# Patient Record
Sex: MALE | Race: WHITE | NOT HISPANIC OR LATINO | Employment: FULL TIME | URBAN - METROPOLITAN AREA
[De-identification: names, ages, dates, MRNs, and addresses within clinical notes are randomized per-mention and may not be internally consistent; named-entity substitution may affect disease eponyms.]

---

## 2022-03-25 ENCOUNTER — TELEPHONE (OUTPATIENT)
Dept: SURGERY | Facility: CLINIC | Age: 42
End: 2022-03-25

## 2022-03-25 NOTE — TELEPHONE ENCOUNTER
"----- Message from Osvaldo Aguirre sent at 3/25/2022 12:22 PM CDT -----  Regarding: Speak with office  Contact: Noé  Consult/Advisory:           Name Of Caller: Noé    Contact Preference?: [phone]         What is the nature of the call?:    Pt is calling to check the status of a referral he said was sent over to the department about 2 weeks ago. Pt would like a call for further assistance.      Additional Notes:  "Thank you for all that you do for our patients'"     "

## 2022-03-28 ENCOUNTER — TELEPHONE (OUTPATIENT)
Dept: SURGERY | Facility: CLINIC | Age: 42
End: 2022-03-28

## 2022-03-28 ENCOUNTER — TELEPHONE (OUTPATIENT)
Dept: SURGERY | Facility: CLINIC | Age: 42
End: 2022-03-28
Payer: MEDICAID

## 2022-04-05 ENCOUNTER — OFFICE VISIT (OUTPATIENT)
Dept: SURGERY | Facility: CLINIC | Age: 42
End: 2022-04-05
Payer: COMMERCIAL

## 2022-04-05 VITALS
WEIGHT: 315 LBS | SYSTOLIC BLOOD PRESSURE: 185 MMHG | HEIGHT: 69 IN | BODY MASS INDEX: 46.65 KG/M2 | TEMPERATURE: 97 F | DIASTOLIC BLOOD PRESSURE: 93 MMHG | HEART RATE: 105 BPM

## 2022-04-05 DIAGNOSIS — D18.03 HEMANGIOMA OF LIVER: Primary | ICD-10-CM

## 2022-04-05 PROCEDURE — 99213 OFFICE O/P EST LOW 20 MIN: CPT | Mod: PBBFAC | Performed by: SURGERY

## 2022-04-05 PROCEDURE — 3077F PR MOST RECENT SYSTOLIC BLOOD PRESSURE >= 140 MM HG: ICD-10-PCS | Mod: CPTII,S$GLB,, | Performed by: SURGERY

## 2022-04-05 PROCEDURE — 3080F PR MOST RECENT DIASTOLIC BLOOD PRESSURE >= 90 MM HG: ICD-10-PCS | Mod: CPTII,S$GLB,, | Performed by: SURGERY

## 2022-04-05 PROCEDURE — 3080F DIAST BP >= 90 MM HG: CPT | Mod: CPTII,S$GLB,, | Performed by: SURGERY

## 2022-04-05 PROCEDURE — 4010F ACE/ARB THERAPY RXD/TAKEN: CPT | Mod: CPTII,S$GLB,, | Performed by: SURGERY

## 2022-04-05 PROCEDURE — 99999 PR PBB SHADOW E&M-EST. PATIENT-LVL III: ICD-10-PCS | Mod: PBBFAC,,, | Performed by: SURGERY

## 2022-04-05 PROCEDURE — 3077F SYST BP >= 140 MM HG: CPT | Mod: CPTII,S$GLB,, | Performed by: SURGERY

## 2022-04-05 PROCEDURE — 99203 PR OFFICE/OUTPT VISIT, NEW, LEVL III, 30-44 MIN: ICD-10-PCS | Mod: S$GLB,,, | Performed by: SURGERY

## 2022-04-05 PROCEDURE — 99999 PR PBB SHADOW E&M-EST. PATIENT-LVL III: CPT | Mod: PBBFAC,,, | Performed by: SURGERY

## 2022-04-05 PROCEDURE — 3008F BODY MASS INDEX DOCD: CPT | Mod: CPTII,S$GLB,, | Performed by: SURGERY

## 2022-04-05 PROCEDURE — 99203 OFFICE O/P NEW LOW 30 MIN: CPT | Mod: S$GLB,,, | Performed by: SURGERY

## 2022-04-05 PROCEDURE — 3008F PR BODY MASS INDEX (BMI) DOCUMENTED: ICD-10-PCS | Mod: CPTII,S$GLB,, | Performed by: SURGERY

## 2022-04-05 PROCEDURE — 4010F PR ACE/ARB THEARPY RXD/TAKEN: ICD-10-PCS | Mod: CPTII,S$GLB,, | Performed by: SURGERY

## 2022-04-05 RX ORDER — CHOLECALCIFEROL (VITAMIN D3) 50 MCG
1 CAPSULE ORAL
COMMUNITY
Start: 2022-03-02

## 2022-04-05 RX ORDER — DULAGLUTIDE 0.75 MG/.5ML
INJECTION, SOLUTION SUBCUTANEOUS
COMMUNITY
End: 2022-04-18

## 2022-04-05 RX ORDER — ATORVASTATIN CALCIUM 40 MG/1
40 TABLET, FILM COATED ORAL NIGHTLY
COMMUNITY
Start: 2022-03-27

## 2022-04-05 RX ORDER — INSULIN DEGLUDEC 100 U/ML
INJECTION, SOLUTION SUBCUTANEOUS DAILY
COMMUNITY
End: 2022-04-18 | Stop reason: SDUPTHER

## 2022-04-05 RX ORDER — METOPROLOL SUCCINATE 25 MG/1
TABLET, EXTENDED RELEASE ORAL
COMMUNITY
Start: 2022-02-22

## 2022-04-05 RX ORDER — ASPIRIN 81 MG/1
81 TABLET ORAL DAILY
COMMUNITY

## 2022-04-05 RX ORDER — FUROSEMIDE 40 MG/1
40 TABLET ORAL 2 TIMES DAILY
COMMUNITY
Start: 2022-03-28

## 2022-04-05 RX ORDER — LOSARTAN POTASSIUM 50 MG/1
50 TABLET ORAL DAILY
COMMUNITY
Start: 2022-03-30

## 2022-04-05 RX ORDER — DEXAMETHASONE 6 MG/1
6 TABLET ORAL DAILY
COMMUNITY
Start: 2022-01-19 | End: 2022-06-20 | Stop reason: HOSPADM

## 2022-04-05 RX ORDER — METFORMIN HYDROCHLORIDE 1000 MG/1
1000 TABLET ORAL 2 TIMES DAILY
COMMUNITY
Start: 2022-03-30 | End: 2022-04-18 | Stop reason: SDUPTHER

## 2022-04-05 NOTE — PROGRESS NOTES
"Subjective:      Noé Rascon is a 41 y.o. year old male who presents to the surgical oncology clinic on 04/05/2022 for hemangioma. Most recent CT scan from 3/11 shows a 7.6cm x 5.7cm mass most consistent with hemangioma. This mass had been observed on prior studies at least since 7/2021. Pt states his symptoms have compromised of weight gain, LE swelling and most generalized abdominal pain with distention during the last year. He states he feels like it has worsened over the last year.  He states there was a few week period where he gained over 20 lbs. He is eating and drinking okay. Having bowel movements. He has diabetes but denies any cardiac  issues and states he has had recent "heart ultrasound" that was normal. He has had no abdominal surgeries. He does not smoke.         Vitals:    04/05/22 0828   BP: (!) 185/93   Pulse: 105   Temp: 97 °F (36.1 °C)      Past Medical History:   Diagnosis Date    Essential (primary) hypertension     Type 2 diabetes mellitus          Current Outpatient Medications   Medication Sig Dispense Refill    aspirin (ECOTRIN) 81 MG EC tablet Take 81 mg by mouth once daily.      atorvastatin (LIPITOR) 40 MG tablet Take 40 mg by mouth nightly.      dexAMETHasone (DECADRON) 6 MG tablet Take 6 mg by mouth once daily.      dulaglutide (TRULICITY) 0.75 mg/0.5 mL pen injector Inject into the skin every 7 days.      furosemide (LASIX) 40 MG tablet Take 40 mg by mouth 2 (two) times daily.      insulin degludec (TRESIBA FLEXTOUCH U-100) 100 unit/mL (3 mL) insulin pen Inject into the skin once daily.      losartan (COZAAR) 50 MG tablet Take 50 mg by mouth once daily.      metFORMIN (GLUCOPHAGE) 1000 MG tablet Take 1,000 mg by mouth 2 (two) times daily.      metoprolol succinate (TOPROL-XL) 25 MG 24 hr tablet TAKE 1 TABLET BY MOUTH ONCE A DAY AS DIRECTED TAKE WITH FOOD      omega 3-dha-epa-fish oil (FISH OIL) 100-160-1,000 mg Cap 1 capsule.       No current facility-administered " medications for this visit.       Review of Systems:  See HPI. Otherwise negative except: None  Objective:     Physical Exam:  General: No acute distress  HEENT: atraumatic  Resp: No resp distress, effort normal, normal chest movements   Cardiac: Normal rate  Abdomen: Soft, minimal right sided tenderness. Distended. No midline laparotomy.   Skin: warm and dry   Neuro: AOX4, at baseline  Psych: Behavior normal       Assessment:       41M with  hepatic hemangioma measuring up to 7.6cm on most recent imaging.  Plan:   Given benign nature and that his symptoms are unlikely due to this mass we will observe   He can follow-up in 1 year with repeat imaging

## 2022-04-18 PROBLEM — E11.9 TYPE 2 DIABETES MELLITUS TREATED WITH INSULIN: Status: ACTIVE | Noted: 2022-04-18

## 2022-04-18 PROBLEM — Z79.4 TYPE 2 DIABETES MELLITUS TREATED WITH INSULIN: Status: ACTIVE | Noted: 2022-04-18

## 2023-03-30 PROBLEM — E11.610 CHARCOT FOOT DUE TO DIABETES MELLITUS: Status: ACTIVE | Noted: 2023-03-30

## 2023-10-16 PROBLEM — E78.2 MIXED HYPERLIPIDEMIA DUE TO TYPE 2 DIABETES MELLITUS: Status: ACTIVE | Noted: 2023-10-16

## 2023-10-16 PROBLEM — I10 BENIGN ESSENTIAL HTN: Status: ACTIVE | Noted: 2023-10-16

## 2023-10-16 PROBLEM — E11.69 MIXED HYPERLIPIDEMIA DUE TO TYPE 2 DIABETES MELLITUS: Status: ACTIVE | Noted: 2023-10-16

## 2023-10-16 PROBLEM — E66.01 MORBID OBESITY WITH BMI OF 45.0-49.9, ADULT: Status: ACTIVE | Noted: 2023-10-16

## 2023-10-16 PROBLEM — Z98.84 HISTORY OF BARIATRIC SURGERY: Status: ACTIVE | Noted: 2023-10-16

## 2023-10-16 PROBLEM — E55.9 VITAMIN D INSUFFICIENCY: Status: ACTIVE | Noted: 2023-10-16
